# Patient Record
Sex: FEMALE | Race: WHITE | NOT HISPANIC OR LATINO | URBAN - METROPOLITAN AREA
[De-identification: names, ages, dates, MRNs, and addresses within clinical notes are randomized per-mention and may not be internally consistent; named-entity substitution may affect disease eponyms.]

---

## 2022-02-28 ENCOUNTER — TELEPHONE (OUTPATIENT)
Dept: OBSTETRICS AND GYNECOLOGY | Facility: CLINIC | Age: 22
End: 2022-02-28

## 2022-02-28 NOTE — TELEPHONE ENCOUNTER
----- Message from Snehal Cummings sent at 2/28/2022  1:35 PM CST -----  Type: Needs Medical Advice  Who Called:  Pt  Best Call Back Number: [phone]  Additional Information: Pt sts she is 32 weeks pregnant needing to est care due to a  move-pt waiting for referral verification from office--please advise--thank you